# Patient Record
Sex: MALE | Race: WHITE | NOT HISPANIC OR LATINO | Employment: FULL TIME | URBAN - METROPOLITAN AREA
[De-identification: names, ages, dates, MRNs, and addresses within clinical notes are randomized per-mention and may not be internally consistent; named-entity substitution may affect disease eponyms.]

---

## 2023-04-27 ENCOUNTER — OFFICE VISIT (OUTPATIENT)
Dept: INTERNAL MEDICINE | Facility: CLINIC | Age: 38
End: 2023-04-27
Payer: COMMERCIAL

## 2023-04-27 VITALS
SYSTOLIC BLOOD PRESSURE: 123 MMHG | RESPIRATION RATE: 18 BRPM | DIASTOLIC BLOOD PRESSURE: 80 MMHG | OXYGEN SATURATION: 97 % | HEART RATE: 106 BPM | TEMPERATURE: 98.8 F

## 2023-04-27 DIAGNOSIS — J02.9 SORE THROAT: ICD-10-CM

## 2023-04-27 DIAGNOSIS — J02.0 STREP THROAT: Primary | ICD-10-CM

## 2023-04-27 LAB — DEPRECATED S PYO AG THROAT QL EIA: POSITIVE

## 2023-04-27 PROCEDURE — 99203 OFFICE O/P NEW LOW 30 MIN: CPT | Performed by: INTERNAL MEDICINE

## 2023-04-27 PROCEDURE — 87880 STREP A ASSAY W/OPTIC: CPT | Performed by: INTERNAL MEDICINE

## 2023-04-27 RX ORDER — PENICILLIN V POTASSIUM 500 MG/1
500 TABLET, FILM COATED ORAL 2 TIMES DAILY
Qty: 20 TABLET | Refills: 0 | Status: SHIPPED | OUTPATIENT
Start: 2023-04-27 | End: 2023-05-07

## 2023-04-27 RX ORDER — PENICILLIN V POTASSIUM 500 MG/1
500 TABLET, FILM COATED ORAL 2 TIMES DAILY
Qty: 10 TABLET | Refills: 0 | Status: SHIPPED | OUTPATIENT
Start: 2023-04-27 | End: 2023-04-27

## 2023-04-27 ASSESSMENT — ENCOUNTER SYMPTOMS: FEVER: 1

## 2023-04-27 NOTE — PROGRESS NOTES
Assessment & Plan     Strep throat  rx with pcn   - penicillin V (VEETID) 500 MG tablet; Take 1 tablet (500 mg) by mouth 2 times daily for 10 days      Prescription drug management             Robb Taylor MD  [facility] VALENTINO Verdin is a 37 year old, presenting for the following health issues:  Fever    Fever, ST.   covid x 2        View : No data to display.              Fever  This is a new (negative covid test today) problem. The current episode started in the past 7 days. Associated symptoms include a fever. He has tried NSAIDs for the symptoms.   History of Present Illness       Reason for visit:  Sore throat,fever  Symptom onset:  1-3 days ago  Symptoms include:  Fever, sore throat  Symptom intensity:  Mild  Symptom progression:  Staying the same  Had these symptoms before:  Yes  Has tried/received treatment for these symptoms:  Yes  Previous treatment was successful:  Yes  Prior treatment description:  Antiboditics  What makes it worse:  No  What makes it better:  No    He eats 2-3 servings of fruits and vegetables daily.He consumes 1 sweetened beverage(s) daily.He exercises with enough effort to increase his heart rate 10 to 19 minutes per day.  He exercises with enough effort to increase his heart rate 3 or less days per week.   He is taking medications regularly.               Review of Systems   Constitutional: Positive for fever.            Objective    /80   Pulse 106   Temp 98.8  F (37.1  C) (Oral)   Resp 18   SpO2 97%   There is no height or weight on file to calculate BMI.  Physical Exam   GENERAL: alert and no distress  HENT: normal cephalic/atraumatic and tonsillar erythema  Neck: tender anterior LAD  Lungs: CTA bilat

## 2024-02-20 ENCOUNTER — OFFICE VISIT (OUTPATIENT)
Dept: DERMATOLOGY | Facility: CLINIC | Age: 39
End: 2024-02-20
Payer: COMMERCIAL

## 2024-02-20 DIAGNOSIS — D49.2 NEOPLASM OF UNSPECIFIED BEHAVIOR OF BONE, SOFT TISSUE, AND SKIN: ICD-10-CM

## 2024-02-20 PROCEDURE — 88305 TISSUE EXAM BY PATHOLOGIST: CPT | Mod: 26 | Performed by: DERMATOLOGY

## 2024-02-20 PROCEDURE — 11102 TANGNTL BX SKIN SINGLE LES: CPT | Performed by: PHYSICIAN ASSISTANT

## 2024-02-20 PROCEDURE — 99207 PR DROP WITH A PROCEDURE: CPT | Mod: 25 | Performed by: PHYSICIAN ASSISTANT

## 2024-02-20 PROCEDURE — 88305 TISSUE EXAM BY PATHOLOGIST: CPT | Mod: TC | Performed by: PHYSICIAN ASSISTANT

## 2024-02-20 ASSESSMENT — PAIN SCALES - GENERAL: PAINLEVEL: NO PAIN (0)

## 2024-02-20 NOTE — LETTER
2/20/2024       RE: Lambert Ang  [address] [facility] [address]     Dear Colleague,    Thank you for referring your patient, Lambert Ang, to the [facility] [place] at [facility]. Please see a copy of my visit note below.    [facility] Dermatology Note  Encounter Date: Feb 20, 2024  Office Visit     Reviewed patients past medical history and pertinent chart review prior to patients visit today.     Dermatology Problem List:  # NUB, anterior neck, shave biopsy 2/20/2024   ____________________________________________    Assessment & Plan:     # Neoplasm of uncertain behavior:  anterior neck  DDx includes SK vs acrochordon. Shave biopsy today.    We discussed options including cryotherapy versus shave versus continued observation. The patient requested shave biopsy today after reviewing risks and benefits.     Procedure Note: Biopsy by shave technique  The risks and benefits of the procedure were described to the patient. These include but are not limited to bleeding, infection, scar, incomplete removal, and non-diagnostic biopsy. Verbal informed consent was obtained. The above site(s) was cleansed with an alcohol pad and injected with 1% lidocaine with epinephrine. Once anesthesia was obtained, a biopsy(ies) was performed with Gilette blade. The tissue(s) was placed in a labeled container(s) with formalin and sent to pathology. Hemostasis was achieved with aluminum chloride. Vaseline and a bandage were applied to the wound(s). The patient tolerated the procedure well and was given post biopsy care instructions.      All risks, benefits and alternatives were discussed with patient.  Patient is in agreement and understands the assessment and plan.  All questions were answered.  Lenore Nascimento PA-C  [facility] Dermatology  _______________________________________    CC: Derm Problem (Lambert is here today for a  lesion of concern on the front of the neck )    HPI:  Mr. Lambert Ang is a(n) 38 year old male who presents today as a new patient for a skin lesion on the anterior neck. The lesion began 10 years ago and has grown over time. It occasionally bleeds, otherwise asymptomatic. No personal or family history of skin cancer.  Patient is otherwise feeling well, without additional skin concerns.    Physical Exam:  SKIN: Focused examination of anterior neck was performed.  - The anterior neck demonstrates a 0.4 x 0.4 cm waxy, pedunculated papule.     - No other lesions of concern on areas examined.     Medications:  No current outpatient medications on file.     No current facility-administered medications for this visit.      Past Medical History:   There is no problem list on file for this patient.    No past medical history on file.    CC Referred Self, MD  No address on file on close of this encounter.

## 2024-02-20 NOTE — NURSING NOTE
Lidocaine-epinephrine 1-1:338133 % injection   1mL once for one use, starting 2/20/2024 ending 2/20/2024,  2mL disp, R-0, injection  Injected by Riky MELVIN CMA

## 2024-02-20 NOTE — NURSING NOTE
Dermatology Rooming Note    Lambert Ang's goals for this visit include:   Chief Complaint   Patient presents with    Derm Problem     Lambert is here today for a lesion of concern on the front of the neck      Riky MELVIN CMA

## 2024-02-20 NOTE — PROGRESS NOTES
[facility] Dermatology Note  Encounter Date: Feb 20, 2024  Office Visit     Reviewed patients past medical history and pertinent chart review prior to patients visit today.     Dermatology Problem List:  # NUB, anterior neck, shave biopsy 2/20/2024   ____________________________________________    Assessment & Plan:     # Neoplasm of uncertain behavior:  anterior neck  DDx includes SK vs acrochordon. Shave biopsy today.    We discussed options including cryotherapy versus shave versus continued observation. The patient requested shave biopsy today after reviewing risks and benefits.     Procedure Note: Biopsy by shave technique  The risks and benefits of the procedure were described to the patient. These include but are not limited to bleeding, infection, scar, incomplete removal, and non-diagnostic biopsy. Verbal informed consent was obtained. The above site(s) was cleansed with an alcohol pad and injected with 1% lidocaine with epinephrine. Once anesthesia was obtained, a biopsy(ies) was performed with Gilette blade. The tissue(s) was placed in a labeled container(s) with formalin and sent to pathology. Hemostasis was achieved with aluminum chloride. Vaseline and a bandage were applied to the wound(s). The patient tolerated the procedure well and was given post biopsy care instructions.      All risks, benefits and alternatives were discussed with patient.  Patient is in agreement and understands the assessment and plan.  All questions were answered.  Lenore Nascimento PA-C  [facility] Dermatology  _______________________________________    CC: Derm Problem (Lambert is here today for a lesion of concern on the front of the neck )    HPI:  Mr. Lambert Ang is a(n) 38 year old male who presents today as a new patient for a skin lesion on the anterior neck. The lesion began 10 years ago and has grown over time. It occasionally bleeds, otherwise asymptomatic. No personal or family history of  skin cancer.  Patient is otherwise feeling well, without additional skin concerns.    Physical Exam:  SKIN: Focused examination of anterior neck was performed.  - The anterior neck demonstrates a 0.4 x 0.4 cm waxy, pedunculated papule.     - No other lesions of concern on areas examined.     Medications:  No current outpatient medications on file.     No current facility-administered medications for this visit.      Past Medical History:   There is no problem list on file for this patient.    No past medical history on file.    CC Referred Self, MD  No address on file on close of this encounter.

## 2024-02-20 NOTE — PATIENT INSTRUCTIONS
Wound Care After a Biopsy    What is a skin biopsy?  A skin biopsy allows the doctor to examine a very small piece of tissue under the microscope to determine the diagnosis and the best treatment for the skin condition. A local anesthetic (numbing medicine) is injected with a very small needle into the skin area to be tested. A small piece of skin is taken from the area. Sometimes a suture (stitch) is used.     What are the risks of a skin biopsy?  I will experience scar, bleeding, swelling, pain, crusting and redness. I may experience incomplete removal or recurrence. Risks of this procedure are excessive bleeding, bruising, infection, nerve damage, numbness, thick (hypertrophic or keloidal) scar and non-diagnostic biopsy.    How should I care for my wound for the first 24 hours?  Keep the wound dry and covered for 24 hours  If it bleeds, hold direct pressure on the area for 15 minutes. If bleeding does not stop, call us or go to the emergency room  Avoid strenuous exercise the first 1-2 days or as your doctor instructs you    How should I care for the wound after 24 hours?  After 24 hours, remove the bandage  You may bathe or shower as normal  If you had a scalp biopsy, you can shampoo as usual and can use shower water to clean the biopsy site daily  Clean the wound once a day with gentle soap and water  Do not scrub, be gentle  Apply white petroleum/Vaseline after cleaning the wound with a cotton swab or a clean finger, and keep the site covered with a Bandaid /bandage. Bandages are not necessary with a scalp biopsy  If you are unable to cover the site with a Bandaid /bandage, re-apply ointment 2-3 times a day to keep the site moist. Moisture will help with healing  Avoid strenuous activity for first 1-2 days  Avoid lakes, rivers, pools, and oceans until the stitches are removed or the site is healed    How do I clean my wound?  Wash hands thoroughly with soap or use hand  before all wound care  Clean  the wound with gentle soap and water  Apply white petroleum/Vaseline  to wound after it is clean  Replace the Bandaid /bandage to keep the wound covered for the first few days or as instructed by your doctor  If you had a scalp biopsy, warm shower water to the area on a daily basis should suffice    What should I use to clean my wound?   Cotton-tipped applicators (Qtips )  White petroleum jelly (Vaseline ). Use a clean new container and use Q-tips to apply.  Bandaids  as needed  Gentle soap     How should I care for my wound long term?  Do not get your wound dirty  Keep up with wound care for one week or until the area is healed.  If you have stitches, stitches need to be removed in 14 days. You may return to our clinic for this or you may have it done locally at your doctor s office.  A small scab will form and fall off by itself when the area is completely healed. The area will be red and will become pink in color as it heals. Sun protection is very important for how your scar will turn out. Sunscreen with an SPF 30 or greater is recommended once the area is healed.  You should have some soreness but it should be mild and slowly go away over several days. Talk to your doctor about using tylenol for pain,    When should I call my doctor?  If you have increased:   Pain or swelling  Pus or drainage (clear or slightly yellow drainage is ok)  Temperature over 100F  Spreading redness or warmth around wound    When will I hear about my results?  The biopsy results can take 2 weeks to come back.  Your results will automatically release to [facility] before your provider has even reviewed them.  The clinic will call you with the results, send you a [facility] message, or have you schedule a follow-up clinic or phone time to discuss the results.  Contact our clinics if you do not hear from us in 2 weeks.    Who should I call with questions?  [facility]: [phone]  [facility]  [facility]: [phone]  For urgent needs outside of business hours call the [facility] at [phone] and ask for the dermatology resident on call

## 2024-02-21 LAB
PATH REPORT.COMMENTS IMP SPEC: NORMAL
PATH REPORT.COMMENTS IMP SPEC: NORMAL
PATH REPORT.FINAL DX SPEC: NORMAL
PATH REPORT.GROSS SPEC: NORMAL
PATH REPORT.MICROSCOPIC SPEC OTHER STN: NORMAL
PATH REPORT.RELEVANT HX SPEC: NORMAL

## 2024-05-19 ENCOUNTER — HEALTH MAINTENANCE LETTER (OUTPATIENT)
Age: 39
End: 2024-05-19

## 2025-05-04 ENCOUNTER — HOSPITAL ENCOUNTER (EMERGENCY)
Facility: CLINIC | Age: 40
Discharge: HOME | End: 2025-05-04
Attending: STUDENT IN AN ORGANIZED HEALTH CARE EDUCATION/TRAINING PROGRAM | Admitting: STUDENT IN AN ORGANIZED HEALTH CARE EDUCATION/TRAINING PROGRAM
Payer: COMMERCIAL

## 2025-05-04 ENCOUNTER — APPOINTMENT (OUTPATIENT)
Dept: GENERAL RADIOLOGY | Facility: CLINIC | Age: 40
End: 2025-05-04
Attending: EMERGENCY MEDICINE
Payer: COMMERCIAL

## 2025-05-04 VITALS
HEIGHT: 72 IN | HEART RATE: 85 BPM | OXYGEN SATURATION: 96 % | TEMPERATURE: 96.7 F | SYSTOLIC BLOOD PRESSURE: 120 MMHG | WEIGHT: 195 LBS | DIASTOLIC BLOOD PRESSURE: 78 MMHG | BODY MASS INDEX: 26.41 KG/M2 | RESPIRATION RATE: 18 BRPM

## 2025-05-04 DIAGNOSIS — S81.012A LACERATION OF LEFT KNEE, INITIAL ENCOUNTER: ICD-10-CM

## 2025-05-04 PROCEDURE — 99283 EMERGENCY DEPT VISIT LOW MDM: CPT | Mod: 25

## 2025-05-04 PROCEDURE — 12001 RPR S/N/AX/GEN/TRNK 2.5CM/<: CPT

## 2025-05-04 PROCEDURE — 73560 X-RAY EXAM OF KNEE 1 OR 2: CPT | Mod: LT

## 2025-05-04 RX ORDER — BUPIVACAINE HYDROCHLORIDE 5 MG/ML
5 INJECTION, SOLUTION EPIDURAL; INTRACAUDAL; PERINEURAL ONCE
Status: DISCONTINUED | OUTPATIENT
Start: 2025-05-04 | End: 2025-05-04 | Stop reason: HOSPADM

## 2025-05-04 ASSESSMENT — ACTIVITIES OF DAILY LIVING (ADL)
ADLS_ACUITY_SCORE: 41
ADLS_ACUITY_SCORE: 41

## 2025-05-04 ASSESSMENT — COLUMBIA-SUICIDE SEVERITY RATING SCALE - C-SSRS
6. HAVE YOU EVER DONE ANYTHING, STARTED TO DO ANYTHING, OR PREPARED TO DO ANYTHING TO END YOUR LIFE?: NO
2. HAVE YOU ACTUALLY HAD ANY THOUGHTS OF KILLING YOURSELF IN THE PAST MONTH?: NO
1. IN THE PAST MONTH, HAVE YOU WISHED YOU WERE DEAD OR WISHED YOU COULD GO TO SLEEP AND NOT WAKE UP?: NO

## 2025-05-04 NOTE — ED PROVIDER NOTES
Emergency Department Note      History of Present Illness     Chief Complaint   Laceration      HPI   Lambert Ang is a 39 year old male who presents with his wife and son to the Emergency Department for a laceration. The patient reports he was moving a mirror in his garage around 1500 when it fell and cut his left knee. Denies history of diabetes or blood thinner use.    Independent Historian   None    Review of External Notes   I reviewed the patient's [facility] that reports he last received his tetanus vaccine in 2018.     Past Medical History     Medical History and Problem List   History reviewed. No pertinent past medical history.     Medications   The patient is not currently taking any perscribed medications.     Surgical History   [place] teeth extraction    Physical Exam     Patient Vitals for the past 24 hrs:   BP Temp Temp src Pulse Resp SpO2 Height Weight   05/04/25 1541 120/78 (!) 96.7  F (35.9  C) Temporal 85 18 96 % 1.829 m (6') 88.5 kg (195 lb)     Physical Exam    Vital signs and nursing notes reviewed    General: Alert, appropriate, family at bedside  Cardiovascular: Appears well perfused. Intact TP pulse bilaterally  Pulmonary: No respiratory distress  Musculoskeletal: Tendon function normal with full ROM in extension and flexion of left knee  Neuro: Normal strength and distal sensation intact to distal left leg  Skin: 2 cm laceration to left knee overlying patella with subcutaneous fat visualized. Does not extend into joint space after exploration through full ROM in bloodless field    Diagnostics     Lab Results   Labs Ordered and Resulted from Time of ED Arrival to Time of ED Departure - No data to display    Imaging   XR Knee Left 1/2 Views   Final Result   IMPRESSION: The left knee is negative for fracture or compartmental narrowing. No effusion. There is some high density material just underneath the skin surface near the superior margin of the patella on the lateral view. This could  represent foreign    body material.        EKG   None    Independent Interpretation   X-ray left knee shows no fracture or effusion. Possible foreign body material in the skin above the superior margin of the patella.    ED Course      Medications Administered   Medications   BUPivacaine (MARCAINE) 0.5% preservative free injection (has no administration in time range)     Procedures     Laceration Repair      Procedure: Laceration Repair    Indication: Laceration    Consent: Verbal    Tetanus status reviewed    Location: Left Knee    Length: 2 cm    Preparation: Irrigation with Sterile Saline.    Anesthesia/Sedation: Bupivacaine - 0.5%      Treatment/Exploration: Wound explored and minimal debris removed  multiple small pieces of glass removed with forceps     Closure: The wound was closed with one layer. Skin/superficial layer was closed with 4 x 4-0 Nylon using Interrupted sutures.     Patient Status: The patient tolerated the procedure well: Yes. There were no complications.     Discussion of Management   None    ED Course   ED Course as of 05/04/25 1726   Sun May 04, 2025   1615 I initially assessed the patient and obtained the above history and physical exam.     1639 I reassessed the patient and updated them on results and plan of care. I repaired the patient's laceration (see procedure section of note). The patient is safe for discharge.    1704 Tdap 2017     Additional Documentation  None    Medical Decision Making / Diagnosis     CMS Diagnoses: None    MIPS       None    MDM   Lambert Ang is a 39 year old male who presents to the Emergency Department with injury to the left knee. See HPI. Vital signs normal. On exam, patient has a laceration to the anterior knee without joint space extension. XR was obtained which reveals likely foreign body. He underwent copious irrigation and manual removal of glass by myself with a forceps. The wound was anesthetized, irrigated, and repaired as outlined above. There  is no evidence at this time of associated fracture, deep space infection, tendon injury, or neurovascular injury.  The patient is to follow-up for wound recheck later this week and suture removal in 8-10 days. Using reasonable clinical judgement, I feel patient is safe for discharge home. Was placed in ace-wrap to encourage/limit knee flexion. Indications for immediate return to ER were reviewed and included but are not limited to, redness, fevers, drainage, increasing pain, or other concerns. Patient agreeable to plan and had questions answered. Tetanus up to date.     Disposition   The patient was discharged.     Diagnosis     ICD-10-CM    1. Laceration of left knee, initial encounter  S81.012A            Discharge Medications   New Prescriptions    No medications on file     Scribe Disclosure:  I, America Machado, am serving as a scribe at 4:13 PM on 5/4/2025 to document services personally performed by Yajaira Navarro PA-C based on my observations and the provider's statements to me.     Yajaira Navarro PA-C on 5/4/2025 at 5:29 PM       Yajaira Navarro PA-C  05/04/25 1725

## 2025-05-04 NOTE — ED NOTES
Emergency Department Technician Wound Irrigation Note:    5/4/2025    4:38 PM      Wound location:  knee lac    Irrigation Fluid: Normal Saline    Estimated Irrigation Volume (60 mL fluid per cm): 1000ml    Pt. Tolerated well    Tanvi Irby

## 2025-05-04 NOTE — DISCHARGE INSTRUCTIONS
Leave bandage in place for 24 hours  After this you may gently clean the wound once daily with soap/water, pat dry, and cover with ointment and a bandage once daily  Wear splint for 3 days, and for comfort after that, to help prevent excessive joint bending and to aid in healing- very important for KNEE LACERATIONS  Follow up this week for wound recheck and in 8-10 days for suture removal. This can be done here, at primary care, urgent care, or by any medical provider.   Return to the ED should you develop redness around the wound, streaking up the leg, fevers, chills, purulent wound drainage, or any further concerns.  Hope you feel better soon!  Discharge Instructions  Laceration (Cut)    You were seen today for a laceration (cut).  Your provider examined your laceration for any problems such a buried foreign body (like glass, a splinter, or gravel), or injury to blood vessels, tendons, and nerves.  Your provider may have also rinsed and/or scrubbed your laceration to help prevent an infection. It may not be possible to find all problems with your laceration on the first visit; occasionally foreign bodies or a tendon injury can go undetected.    Your laceration may have been closed in one of several ways:  No closure: many wounds will heal just fine without closure.  Stitches: regular stitches that require removal.  Staples: skin staples are often used in the scalp/head.  Wound adhesive (glue): skin glue can be used for certain lacerations and doesn t require removal.  Wound strips (aka Butterfly bandages or steri-strips): these are bandages that help to close a wound.  Absorbable stitches:  dissolving  stitches that go away on their own and usually don t require removal.    A small percentage of wounds will develop an infection regardless of how well the wound is cared for. Antibiotics are generally not indicated to prevent an infection so are only given for a small number of high-risk wounds. Some lacerations are  too high risk to close, and are left open to heal because closure can increase the likelihood that an infection will develop.    Remember that all lacerations, no matter how expertly repaired, will cause scarring. We consider many factors, techniques, and materials, in our efforts to provide the best possible cosmetic outcome.    Generally, every Emergency Department visit should have a follow-up clinic visit with either a primary or a specialty clinic/provider. Please follow-up as instructed by your emergency provider today.     Return to the Emergency Department right away if:  You have more redness, swelling, pain, drainage (pus), a bad smell, or red streaking from your laceration as these symptoms could indicate an infection.  You have a fever of 100.4 F or more.  You have bleeding that you cannot stop at home. If your cut starts to bleed, hold pressure on the bleeding area with a clean cloth or put pressure over the bandage.  If the bleeding does not stop after using constant pressure for 30 minutes, you should return to the Emergency Department for further treatment.  An area past the laceration is cool, pale, or blue compared with the other side, or has a slower return of color when squeezed.  Your dressing seems too tight or starts to get uncomfortable or painful. For children, signs of a problem might be irritability or restlessness.  You have loss of normal function or use of an area, such as being unable to straighten or bend a finger normally.  You have a numb area past the laceration.    Return to the Emergency Department or see your regular provider if:  The laceration starts to come open.   You have something coming out of the cut or a feeling that there is something in the laceration.  Your wound will not heal, or keeps breaking open. There can always be glass, wood, dirt or other things in any wound.  They will not always show up, even on x-rays.  If a wound does not heal, this may be why, and it is  important to follow-up with your regular provider.    Home Care:  Take your dressing off in 12-24 hours, or as instructed by your provider, to check your laceration. Remove the dressing sooner if it seems too tight or painful, or if it is getting numb, tingly, or pale past the dressing.  Gently wash your laceration 1-2 times daily with clean water and mild soap. It is okay to shower or run clean water over the laceration, but do not let the laceration soak in water (no swimming).  If your laceration was closed with wound adhesive or strips: pat it dry and leave it open to the air. For all other repairs: after you wash your laceration, or at least 2 times a day, apply antibiotic ointment (such as Neosporin  or Bacitracin ) to the laceration, then cover it with a Band-Aid  or gauze.  Keep the laceration clean. Wear gloves or other protective clothing if you are around dirt.    Follow-up for removal:  If your wound was closed with staples or regular stitches, they need to be removed according to the instructions and timeline specified by your provider today.  If your wound was closed with absorbable ( dissolving ) sutures, they should fall out, dissolve, or not be visible in about one week. If they are still visible, then they should be removed according to the instructions and timeline specified by your provider today.    Scars:  To help minimize scarring:  Wear sunscreen over the healed laceration when out in the sun.  Massage the area regularly once healed.  You may apply Vitamin E to the healed wound.  Wait. Scars improve in appearance over months and years.    If you were given a prescription for medicine here today, be sure to read all of the information (including the package insert) that comes with your prescription.  This will include important information about the medicine, its side effects, and any warnings that you need to know about.  The pharmacist who fills the prescription can provide more information  and answer questions you may have about the medicine.  If you have questions or concerns that the pharmacist cannot address, please call or return to the Emergency Department.       Remember that you can always come back to the Emergency Department if you are not able to see your regular provider in the amount of time listed above, if you get any new symptoms, or if there is anything that worries you.

## 2025-05-08 ENCOUNTER — OFFICE VISIT (OUTPATIENT)
Dept: INTERNAL MEDICINE | Facility: CLINIC | Age: 40
End: 2025-05-08
Payer: COMMERCIAL

## 2025-05-08 VITALS
HEART RATE: 96 BPM | SYSTOLIC BLOOD PRESSURE: 131 MMHG | OXYGEN SATURATION: 97 % | RESPIRATION RATE: 18 BRPM | BODY MASS INDEX: 26.45 KG/M2 | TEMPERATURE: 97.5 F | DIASTOLIC BLOOD PRESSURE: 77 MMHG | WEIGHT: 195 LBS

## 2025-05-08 DIAGNOSIS — S81.012D LACERATION OF LEFT KNEE, SUBSEQUENT ENCOUNTER: Primary | ICD-10-CM

## 2025-05-08 ASSESSMENT — PAIN SCALES - GENERAL: PAINLEVEL_OUTOF10: MODERATE PAIN (4)

## 2025-05-08 NOTE — PROGRESS NOTES
Assessment & Plan     Laceration of left knee, subsequent encounter  -sutures intact, site looks good  -continue ace wrap and avoiding bending    Follow up next week for suture removal-nurse only.       MED REC REQUIRED  Post Medication Reconciliation Status: discharge medications reconciled, continue medications without change  BMI  Estimated body mass index is 26.45 kg/m  as calculated from the following:    Height as of 5/4/25: 1.829 m (6').    Weight as of this encounter: 88.5 kg (195 lb).             Bayron Verdin is a 39 year old, presenting for the following health issues:  Hospital F/U and Wound Check        5/8/2025     8:46 AM   Additional Questions   Roomed by hope r   Accompanied by self         5/8/2025     8:46 AM   Patient Reported Additional Medications   Patient reports taking the following new medications n/a     Wound Check        Patient is here today for ER follow-up and wound check.  Was in the ER over the weekend.  Sutures look good, no redness.  Has been using ace wrap.        Hospital Follow-up Visit:                Objective    /77 (BP Location: Left arm, Patient Position: Sitting, Cuff Size: Adult Regular)   Pulse 96   Temp 97.5  F (36.4  C) (Tympanic)   Resp 18   Wt 88.5 kg (195 lb)   SpO2 97%   BMI 26.45 kg/m    Body mass index is 26.45 kg/m .  Physical Exam  Vitals and nursing note reviewed.   Constitutional:       General: He is not in acute distress.     Appearance: Normal appearance. He is not ill-appearing.   Skin:     General: Skin is warm and dry.      Comments: See photo    Neurological:      General: No focal deficit present.      Mental Status: He is alert and oriented to person, place, and time. Mental status is at baseline.                    Signed Electronically by: Keisha Mcleod, CNP

## 2025-05-13 ENCOUNTER — ALLIED HEALTH/NURSE VISIT (OUTPATIENT)
Dept: NURSING | Facility: CLINIC | Age: 40
End: 2025-05-13
Payer: COMMERCIAL

## 2025-05-13 DIAGNOSIS — Z48.02 VISIT FOR SUTURE REMOVAL: Primary | ICD-10-CM

## 2025-05-13 PROCEDURE — 99207 PR NO CHARGE NURSE ONLY: CPT

## 2025-05-13 NOTE — PROGRESS NOTES
Lambert Ang presents to the clinic for removal of sutures. The patient has 4 sutures in place for 9 days.   There has been no patient reported signs or symptoms of infection or drainage.   4  sutures are seen and located on the Left knee. Tetanus status is up to date.   All sutures were easily removed today. Routine wound care discussed by the RN. One steri strip placed due to slight opening of incision when one suture removed. Approximately, 3 mm long, by 1-2 mm wide. No drainage.   Advised pt that Steri strip will fall off on own or can remove gently in one week.   The patient will follow up as needed.

## 2025-06-08 ENCOUNTER — HEALTH MAINTENANCE LETTER (OUTPATIENT)
Age: 40
End: 2025-06-08